# Patient Record
(demographics unavailable — no encounter records)

---

## 2025-07-17 NOTE — HISTORY OF PRESENT ILLNESS
[FreeTextEntry1] : Patient presents to the office today because of a recent episode of syncope.  Patient states he had been feeling a bit unwell with some abdominal discomfort 1 night.  Later in that night he started feeling nauseous and had an urge to either vomit or have a bowel movement.  He went to the bathroom and got dizzy and passed out.  He was apparently out for about 20 seconds.  EMS was called and came and evaluated him.  He was feeling better and he decided not to go to the hospital.  He has continued to feel well since that time.  He reports no other prior episodes of syncope.  Blood pressures at home generally are in the 110s to 130s over 70s to 80s.  Patient denies chest pain, shortness of breath, palpitations, orthopnea.

## 2025-07-17 NOTE — DISCUSSION/SUMMARY
[FreeTextEntry1] : 1.  Check echocardiogram and carotid Doppler given his episode of syncope. 2.  Check nuclear stress test given his elevated calcium score and family history 3.  Check abdominal aortic Doppler given his family history of AAA. 4.  Continue rosuvastatin for dyslipidemia.  He will confirm the dose for me. 5.  He will have blood work with his primary as scheduled. 6.  Encouraged him to maintain all of his healthy habits with diet and exercise. 7.  Follow up here after testing, and will make further recommendations at that time. [EKG obtained to assist in diagnosis and management of assessed problem(s)] : EKG obtained to assist in diagnosis and management of assessed problem(s)

## 2025-07-17 NOTE — ASSESSMENT
[FreeTextEntry1] : EKG: Sinus rhythm with no significant ST or T wave changes.  59-year-old man with a past medical history of dyslipidemia, family history of AAA, elevated calcium score who presents to me for evaluation given recent episode of syncope.  Patient syncope appears to have been vagal and related to a GI illness.  Patient does carry some risk including his family history of AAA and elevated calcium score and I would like to do some baseline cardiac testing including echocardiogram, stress testing and an abdominal aortic Doppler to further evaluate his aorta.  Blood pressure appears to be reasonably controlled at home.  Blood work from last year showed control of his lipids and no evidence of diabetes.